# Patient Record
Sex: FEMALE | URBAN - METROPOLITAN AREA
[De-identification: names, ages, dates, MRNs, and addresses within clinical notes are randomized per-mention and may not be internally consistent; named-entity substitution may affect disease eponyms.]

---

## 2017-04-10 ENCOUNTER — COMMUNICATION - HEALTHEAST (OUTPATIENT)
Dept: INTERNAL MEDICINE | Facility: CLINIC | Age: 52
End: 2017-04-10

## 2017-04-10 ENCOUNTER — OFFICE VISIT - HEALTHEAST (OUTPATIENT)
Dept: INTERNAL MEDICINE | Facility: CLINIC | Age: 52
End: 2017-04-10

## 2017-04-10 DIAGNOSIS — R23.3 BRUISES EASILY: ICD-10-CM

## 2017-04-10 DIAGNOSIS — T07.XXXA MULTIPLE BRUISES: ICD-10-CM

## 2017-04-11 LAB
BASOPHILS # BLD AUTO: 0 THOU/UL (ref 0–0.2)
BASOPHILS NFR BLD AUTO: 0 % (ref 0–2)
EOSINOPHIL # BLD AUTO: 0.2 THOU/UL (ref 0–0.4)
EOSINOPHIL NFR BLD AUTO: 2 % (ref 0–6)
ERYTHROCYTE [DISTWIDTH] IN BLOOD BY AUTOMATED COUNT: 17.4 % (ref 11–14.5)
HCT VFR BLD AUTO: 41.4 % (ref 35–47)
HGB BLD-MCNC: 12.7 G/DL (ref 12–16)
LAB AP CHARGES (HE HISTORICAL CONVERSION): NORMAL
LYMPHOCYTES # BLD AUTO: 1.6 THOU/UL (ref 0.8–4.4)
LYMPHOCYTES NFR BLD AUTO: 17 % (ref 20–40)
MCH RBC QN AUTO: 29.5 PG (ref 27–34)
MCHC RBC AUTO-ENTMCNC: 30.7 G/DL (ref 32–36)
MCV RBC AUTO: 96 FL (ref 80–100)
MONOCYTES # BLD AUTO: 0.4 THOU/UL (ref 0–0.9)
MONOCYTES NFR BLD AUTO: 4 % (ref 2–10)
NEUTROPHILS # BLD AUTO: 7.6 THOU/UL (ref 2–7.7)
NEUTROPHILS NFR BLD AUTO: 77 % (ref 50–70)
PATH REPORT.COMMENTS IMP SPEC: NORMAL
PATH REPORT.COMMENTS IMP SPEC: NORMAL
PATH REPORT.FINAL DX SPEC: NORMAL
PATH REPORT.MICROSCOPIC SPEC OTHER STN: ABNORMAL
PATH REPORT.MICROSCOPIC SPEC OTHER STN: NORMAL
PATH REPORT.RELEVANT HX SPEC: NORMAL
PLATELET # BLD AUTO: 339 THOU/UL (ref 140–440)
PMV BLD AUTO: 9.9 FL (ref 8.5–12.5)
RBC # BLD AUTO: 4.31 MILL/UL (ref 3.8–5.4)
WBC: 9.8 THOU/UL (ref 4–11)

## 2017-04-14 ENCOUNTER — AMBULATORY - HEALTHEAST (OUTPATIENT)
Dept: INTERNAL MEDICINE | Facility: CLINIC | Age: 52
End: 2017-04-14

## 2017-08-05 ENCOUNTER — HEALTH MAINTENANCE LETTER (OUTPATIENT)
Age: 52
End: 2017-08-05

## 2021-05-30 VITALS — WEIGHT: 154.4 LBS | BODY MASS INDEX: 24.18 KG/M2

## 2021-06-09 NOTE — PROGRESS NOTES
4/10/2017     /64  Pulse 70  Wt 154 lb 6.4 oz (70 kg)  BMI 24.18 kg/m2    Past Medical History:   Diagnosis Date     GARETH (generalized anxiety disorder)        Social History     Social History     Marital status:      Spouse name: N/A     Number of children: N/A     Years of education: N/A     Occupational History     Not on file.     Social History Main Topics     Smoking status: Former Smoker     Smokeless tobacco: Never Used      Comment: E-cigarettes     Alcohol use 9.0 oz/week     10 Standard drinks or equivalent, 5 Shots of liquor per week      Comment: She is drinking FIVE per week - 4/10/17     Drug use: No     Sexual activity: Yes     Partners: Male     Birth control/ protection: None     Other Topics Concern     Not on file     Social History Narrative       Family History   Problem Relation Age of Onset     Arthritis Mother      Kidney failure Mother      Heart disease Mother      Alcohol abuse Father      No Medical Problems Daughter      No Medical Problems Son        As part of this visit I have today personally reviewed past medical history, family medical history, social history (specifically including tobacco use), current medications and intolerances/allergies.  I have updated and/or or corrected these areas of history as may have been appropriate.    No Known Allergies    Current Outpatient Prescriptions   Medication Sig     ibuprofen (ADVIL,MOTRIN) 600 MG tablet Take 1 tablet (600 mg total) by mouth 4 (four) times a day. Take with food. For left shoulder.     fluticasone (FLONASE) 50 mcg/actuation nasal spray 1 spray in each nostril twice a day       Patient Active Problem List   Diagnosis     Rhinitis Medicamentosa     Allergic Rhinitis     Acne     Skin Neoplasm Of Uncertain Behavior     Moderate Recurrent Major Depression     Insomnia     Rotator cuff tendinitis - LEFT shoulder       Lipid status -    Lab Results   Component Value Date    CHOL 198 05/02/2014     Lab Results  "  Component Value Date    HDL 50 2014     Lab Results   Component Value Date    LDLCALC 140.2 (H) 2014     Lab Results   Component Value Date    TRIG 39 2014     The 10-year ASCVD risk score (Theresa BERNABE Jr, et al., 2013) is: 1.3%    Values used to calculate the score:      Age: 51 years      Sex: Female      Is Non- : No      Diabetic: No      Tobacco smoker: No      Systolic Blood Pressure: 128 mmHg      Is BP treated: No      HDL Cholesterol: 50 mg/dL      Total Cholesterol: 198 mg/dL     CBC monitoring - will update.  Lab Results   Component Value Date    WBC 12.0 (H) 2014    HGB 11.1 (L) 2014    HCT 33.3 (L) 2014    MCV 93 2014     2014       CC:  Bruising    PI:  right leg bruised a week or so ago.  It hurts.  This is her right thigh.  It involves proximal/lateral to distal medial.   No injury.  Easy bruising - she has had this in the past.  Her mother used to have this as well.  For Ramandeep - it tends to come and go.  The bruising tends to come when she is emotionally stressed.  She had bruises over the past week.  They go away in a day or two.  They are \"dark purple.\"    Nosebleeds - denied.  Teeth/gums bleed - denied.  GI - denied.   - denied.  Pulm - denies hemoptysis.     x 2 - no bleeding issues after these C-sections.    Numbness - sometimes the whole right thigh and leg gets a little numb.  She senses that the whole leg and thigh gets \"not totally numb, but a little.\"    Exam  Physical examination  General-this is a 51-year-old [place]-American in no acute distress.  Skin-there are no bruises of the arms, forearms, thighs.    Right thigh pain - medially with sartorius stress.  Right thigh pain - laterally with resisted external rotation.  There is NO tenderness anywhere in the thigh.    Recent cold - recently she had a typical cold with coughing.      Impression    1.  Periodic bruising with very rapid resolution of " those bruises  2.  Residual right thigh pain with certain resisted movements.  This came on with a recent episode of bruising.  I believe he can be safely observed.  3.  Recent viral URi    Plan    1.  I told her that I would be unable to become her primary care physician.  It appears that someone has made me her PCP, however, without my permission.  2.  I did not set up a new appointment for her.      Much or all of the text in this note was generated through the use of Dragon Dictate voice-to-text software.  Errors in spelling or words which seem out of context are unintentional.  Dragon has a significant issue with pronouns and, of course, homonyms.  Errors with words of this sort may escape editing.        Patient Instructions   1.  Easy bruising, but they are gone now - how annoying, both for you and for me to be able to comment on the bruises.    2.  You could have a low platelet count.  I will get a blood test.    3.  You could have platelets that don't work well.  I will get a platelet function test.    4.  You could have a problem with your blood clotting system in general - which I doubt - so I will get two blood clotting tests.    5.  I believe that yourd problem will go away as mysteriously as it came.  The next time, if there is a next time, you need to have your bruises seen while you have them, so be insistent that you get seen promptly and explain why.    6.  I will let you know what your lab tests show.  I will send an e-mail.    7.  You don't need any immediate return visit.